# Patient Record
Sex: MALE
[De-identification: names, ages, dates, MRNs, and addresses within clinical notes are randomized per-mention and may not be internally consistent; named-entity substitution may affect disease eponyms.]

---

## 2024-08-09 PROBLEM — Z00.00 ENCOUNTER FOR PREVENTIVE HEALTH EXAMINATION: Status: ACTIVE | Noted: 2024-08-09

## 2024-08-14 ENCOUNTER — APPOINTMENT (OUTPATIENT)
Dept: ORTHOPEDIC SURGERY | Facility: CLINIC | Age: 29
End: 2024-08-14
Payer: COMMERCIAL

## 2024-08-14 VITALS
HEIGHT: 69 IN | SYSTOLIC BLOOD PRESSURE: 133 MMHG | DIASTOLIC BLOOD PRESSURE: 87 MMHG | WEIGHT: 200 LBS | HEART RATE: 71 BPM | BODY MASS INDEX: 29.62 KG/M2

## 2024-08-14 DIAGNOSIS — S93.492A SPRAIN OF OTHER LIGAMENT OF LEFT ANKLE, INITIAL ENCOUNTER: ICD-10-CM

## 2024-08-14 PROCEDURE — 99203 OFFICE O/P NEW LOW 30 MIN: CPT

## 2024-08-15 NOTE — DISCUSSION/SUMMARY
[de-identified] : This patient presents for initial consultation evaluation regarding left ankle sprain which he states sustained 3 weeks ago.  His physical dam and x-rays are consistent with a mild ankle sprain with some residual pain and swelling.  I do think he benefit from a course of therapy to mobilize the ankle and to help reduce edema.  He was given a prescription for therapy at today's visit.  He will attend therapy twice a week and see his back in 1 month for follow-up and reevaluation.  He should remain out of any high impact activities until he see him back in the office in 1 month.  At least 30 minutes was spent performing the evaluation and management on today's office visit.  This includes but is not limited to preparing to see patient including review of any test results or outside medical records, obtaining and/or reviewing separately obtained history, performing examination and evaluation, counseling and educating the patient on their diagnosis and treatment recommendations, ordering medications, tests, or procedures, documenting clinical information in the electronic health record, independently interpreting results (not separately reported) and communicating results to the patient, and coordination of care.

## 2024-08-15 NOTE — HISTORY OF PRESENT ILLNESS
[de-identified] : This patient presents today for evaluation regarding left ankle injury.  States that 3 weeks ago he rolled the left ankle in and sustained significant sprain.  He noted pain swelling and ecchymosis about the lateral aspect of the elbow.  Some of the pain and swelling has improved however he still complains of lateral pain and continued swelling with activity.  Pain level is 4-5 at rest and increases to 7-8 out of 10 at the end of the day with activity.  He has been using a ankle support and has stays on the side.  He is also been using Aleve for the pain.  He presents today for evaluation regarding continued pain and swelling about the ankle.

## 2024-08-15 NOTE — PHYSICAL EXAM
[de-identified] : X-rays were reviewed from the emergency room.  AP lateral mortise views the left ankle joint space well-maintained.  A fracture dislocation or degenerative disease is noted. [de-identified] : The patient appears well nourished  and in no apparent distress.  The patient is alert and oriented to person, place, and time.   Affect and mood appear normal.    The head is normocephalic and atraumatic.  The eyes reveal normal sclera and extra ocular muscles are intact.   The neck appears normal with no jugular venous distention or masses noted.   Skin shows normal turgor with no evidence of eczema or psoriasis.  No respiratory distress noted.  The patient ambulates with an antalgic gait.  The left ankle has mild loss of range of motion.  Tenderness noted over the lateral ligaments.  Soft tissue swelling noted over the anterior talofibular ligament.  There is no ecchymosis.  There is no warmth or erythema.    There is no instability to inversion or to anterior drawer.  There is normal strength.  Sensation is intact. Pulses and capillary refill are normal.    No edema or lymphadenopathy noted.

## 2024-08-15 NOTE — DISCUSSION/SUMMARY
[de-identified] : This patient presents for initial consultation evaluation regarding left ankle sprain which he states sustained 3 weeks ago.  His physical dam and x-rays are consistent with a mild ankle sprain with some residual pain and swelling.  I do think he benefit from a course of therapy to mobilize the ankle and to help reduce edema.  He was given a prescription for therapy at today's visit.  He will attend therapy twice a week and see his back in 1 month for follow-up and reevaluation.  He should remain out of any high impact activities until he see him back in the office in 1 month.  At least 30 minutes was spent performing the evaluation and management on today's office visit.  This includes but is not limited to preparing to see patient including review of any test results or outside medical records, obtaining and/or reviewing separately obtained history, performing examination and evaluation, counseling and educating the patient on their diagnosis and treatment recommendations, ordering medications, tests, or procedures, documenting clinical information in the electronic health record, independently interpreting results (not separately reported) and communicating results to the patient, and coordination of care.

## 2024-08-15 NOTE — HISTORY OF PRESENT ILLNESS
[de-identified] : This patient presents today for evaluation regarding left ankle injury.  States that 3 weeks ago he rolled the left ankle in and sustained significant sprain.  He noted pain swelling and ecchymosis about the lateral aspect of the elbow.  Some of the pain and swelling has improved however he still complains of lateral pain and continued swelling with activity.  Pain level is 4-5 at rest and increases to 7-8 out of 10 at the end of the day with activity.  He has been using a ankle support and has stays on the side.  He is also been using Aleve for the pain.  He presents today for evaluation regarding continued pain and swelling about the ankle.

## 2024-08-15 NOTE — PHYSICAL EXAM
[de-identified] : X-rays were reviewed from the emergency room.  AP lateral mortise views the left ankle joint space well-maintained.  A fracture dislocation or degenerative disease is noted. [de-identified] : The patient appears well nourished  and in no apparent distress.  The patient is alert and oriented to person, place, and time.   Affect and mood appear normal.    The head is normocephalic and atraumatic.  The eyes reveal normal sclera and extra ocular muscles are intact.   The neck appears normal with no jugular venous distention or masses noted.   Skin shows normal turgor with no evidence of eczema or psoriasis.  No respiratory distress noted.  The patient ambulates with an antalgic gait.  The left ankle has mild loss of range of motion.  Tenderness noted over the lateral ligaments.  Soft tissue swelling noted over the anterior talofibular ligament.  There is no ecchymosis.  There is no warmth or erythema.    There is no instability to inversion or to anterior drawer.  There is normal strength.  Sensation is intact. Pulses and capillary refill are normal.    No edema or lymphadenopathy noted.

## 2024-08-27 ENCOUNTER — APPOINTMENT (OUTPATIENT)
Dept: ORTHOPEDIC SURGERY | Facility: CLINIC | Age: 29
End: 2024-08-27